# Patient Record
(demographics unavailable — no encounter records)

---

## 2024-11-15 NOTE — HISTORY OF PRESENT ILLNESS
[Former] : Former [TextBox_13] : Patient is scheduled for a annual  LDCT for lung cancer screening.  Chart review performed to confirm eligibility for LDCT.    No documented personal or family history of lung cancer. No documented s/s of lung cancer. Patient is a former smoker with a 94 pack year hx.  [PacksperYear] : 94

## 2024-11-18 NOTE — HISTORY OF PRESENT ILLNESS
[TextBox_13] : Patient is scheduled for a  annual  LDCT for lung cancer screening. Chart review performed to confirm eligibility for LDCT.    No documented personal or family history of lung cancer. No documented s/s of lung cancer. Patient is a former smoker with a 94 pack year hx. [PacksperYear] : 94 [Former] : former [>= 20 pack years] : >= 20 pack years [Never] : never [TextBox_4] : Patient is a 65y/o M with PMHX of COPD, HTN, DM2, obesity, former heavy smoker, who presents with wife for F/U.   Is doing well overall, denies sob, cough, limitation in activity tolerance. Is using Trelegy inhaler daily, has not used albuterol since admission for PNA in January. Quit smoking one year ago. Seen and followed by Endocrinologist is on Rybelsus.    [TextBox_11] : 2.5 [YearQuit] : 2023

## 2024-11-18 NOTE — ASSESSMENT
[FreeTextEntry1] : 67y/o M with PMHX of COPD, HTN, DM2, obesity, former heavy smoker, who presents with wife for F/U.   Is doing well overall, denies sob, cough, limitation in activity tolerance. Is using Trelegy inhaler daily, has not used albuterol since admission for PNA in January. Quit smoking one year ago. Seen and followed by Endocrinologist is on Rybelsus.    -Continue Trelegy, albuterol PRN -Continue diet, exercise and weight loss -LDCT scan done 11/16: expedited reading requested -Flu vaccination administered, PNA done last year, Reviewed recommended RSV, Covid  Attending; agree with above quit smoking a year ago! feeling overall well.  using trelegy daily pt hasnt had a PFT since 2020, recommended one - he prefers to waith until next visit weight loss! f/u LDCT read flu today

## 2024-12-07 NOTE — HISTORY OF PRESENT ILLNESS
[FreeTextEntry1] : Priyank Escobar returns to the office today.  He is 66 years old with history of BPH and kidney stones.  He has been using finasteride and tamsulosin for some time and reports that when he uses medication the stream is generally adequate but without the medication he notices a clear weakness of the stream.  He also was experiencing symptoms of urinary urgency and urge associated urinary incontinence.  The incontinence is occasional and not a regular event.  No history of hematuria or retention requiring catheterization recently.  He denies any symptoms of dysuria currently.    PSA history: PSA 2.15 (2021) PSA 1.4 (OCt 2024)

## 2024-12-07 NOTE — ASSESSMENT
[FreeTextEntry1] : The patient recently had a sonogram of the abdomen.  This showed no evidence of kidney stones or hydronephrosis on either side.  Currently there are no kidney stones present.  I would continue preventative recommendations including hydration and low-sodium diet.  Weight loss may also be helpful here to minimize the risk of kidney stones.  With regard to the BPH and voiding symptoms, he is having some irritative symptoms including urge incontinence despite the use of the medication for the BPH.  At this point he is not symptomatic enough in my opinion to warrant moving towards surgical intervention but we did discuss the potential for such a remedy.  His prostate is between 80 and 90 cm at last evaluation and he could be a potential candidate for water jet ablation of the prostate or laser enucleation should he have progression of his symptoms.  His PSA levels have been historically low and most recently was 2.15.  Even in the context of him being on finasteride and compensating for this, he would still have a low PSA density and I do not have concerns to move towards MRI imaging of the prostate at this time.  We will continue his current medical management and he will let me know if he has any further progression of symptoms.  Otherwise I will reassess next year.

## 2024-12-07 NOTE — LETTER BODY
[Dear  ___] : Dear  [unfilled], [Courtesy Letter:] : I had the pleasure of seeing your patient, [unfilled], in my office today. [Please see my note below.] : Please see my note below. [FreeTextEntry2] : Stan Christie MD [address] [place], [place] [id number] [FreeTextEntry3] : Sincerely,      Fortino Weber MD, FACS Director of Urology Services, [facility] Chief of Urology, [facility]  of Urology   [facility], [place] [id number] P: [phone] F: [phone] [facility]

## 2024-12-07 NOTE — LETTER BODY
[Dear  ___] : Dear  [unfilled], [Courtesy Letter:] : I had the pleasure of seeing your patient, [unfilled], in my office today. [Please see my note below.] : Please see my note below. [FreeTextEntry2] : Stan Christie MD [address] [place], [place] [id number] [FreeTextEntry3] : Sincerely,      Fortino Weber MD, FACS Director of Urology Services, [facility] Chief of Urology, [facility]  of Urology   [facility], [place] [id number] P: [phone] F: [phone] [facility]